# Patient Record
Sex: FEMALE | Race: WHITE | URBAN - NONMETROPOLITAN AREA
[De-identification: names, ages, dates, MRNs, and addresses within clinical notes are randomized per-mention and may not be internally consistent; named-entity substitution may affect disease eponyms.]

---

## 2024-01-08 ENCOUNTER — TELEPHONE (OUTPATIENT)
Dept: FAMILY MEDICINE CLINIC | Age: 59
End: 2024-01-08

## 2024-01-08 NOTE — TELEPHONE ENCOUNTER
Spoke with patient. Her  James had spoke with Dr. Leopolds  and informed her that she is taking new patients. Informed patient I would talk have to talk with Dr. Leopold first and I would get her a call back later today or tomorrow. Please advise.

## 2024-01-08 NOTE — TELEPHONE ENCOUNTER
----- Message from Lawanda De La Torre sent at 1/8/2024 12:39 PM EST -----  Subject: Appointment Request    Reason for Call: New Patient/New to Provider Appointment needed: New   Patient Request Appointment    QUESTIONS    Reason for appointment request? No appointments available during search     Additional Information for Provider? Patient would like if office staff   could call back to establish care with Katelyn A Leopold, MD, patient   request anytime after 11:00am any day but Thursday's   ---------------------------------------------------------------------------  --------------  CALL BACK INFO  [phone]; OK to leave message on voicemail  ---------------------------------------------------------------------------  --------------  SCRIPT ANSWERS

## 2024-01-08 NOTE — TELEPHONE ENCOUNTER
----- Message from Lawanda De La Torre sent at 1/8/2024 12:39 PM EST -----  Subject: Appointment Request    Reason for Call: New Patient/New to Provider Appointment needed: New   Patient Request Appointment    QUESTIONS    Reason for appointment request? No appointments available during search     Additional Information for Provider? Patient would like if office staff   could call back to establish care with Katelyn A Leopold, MD, patient   request anytime after 11:00am any day but Thursday's   ---------------------------------------------------------------------------  --------------  CALL BACK INFO  [phone]; OK to leave message on voicemail  ---------------------------------------------------------------------------  --------------  SCRIPT ANSWERS

## 2024-01-08 NOTE — TELEPHONE ENCOUNTER
I am not familiar with this patient and my  is not familiar either.  I wonder if this patient is referring to the [facility] as she is from [facility]?

## 2024-01-09 ENCOUNTER — OFFICE VISIT (OUTPATIENT)
Dept: FAMILY MEDICINE CLINIC | Age: 59
End: 2024-01-09
Payer: COMMERCIAL

## 2024-01-09 ENCOUNTER — NURSE ONLY (OUTPATIENT)
Dept: LAB | Age: 59
End: 2024-01-09

## 2024-01-09 VITALS
DIASTOLIC BLOOD PRESSURE: 80 MMHG | BODY MASS INDEX: 36.62 KG/M2 | HEIGHT: 65 IN | OXYGEN SATURATION: 97 % | SYSTOLIC BLOOD PRESSURE: 112 MMHG | HEART RATE: 93 BPM | WEIGHT: 219.8 LBS

## 2024-01-09 DIAGNOSIS — R09.81 CHRONIC NASAL CONGESTION: ICD-10-CM

## 2024-01-09 DIAGNOSIS — E66.09 CLASS 2 OBESITY DUE TO EXCESS CALORIES WITHOUT SERIOUS COMORBIDITY WITH BODY MASS INDEX (BMI) OF 36.0 TO 36.9 IN ADULT: ICD-10-CM

## 2024-01-09 DIAGNOSIS — Z00.00 WELLNESS EXAMINATION: Primary | ICD-10-CM

## 2024-01-09 DIAGNOSIS — Z00.00 WELLNESS EXAMINATION: ICD-10-CM

## 2024-01-09 PROCEDURE — 99386 PREV VISIT NEW AGE 40-64: CPT | Performed by: FAMILY MEDICINE

## 2024-01-09 PROCEDURE — G8484 FLU IMMUNIZE NO ADMIN: HCPCS | Performed by: FAMILY MEDICINE

## 2024-01-09 SDOH — ECONOMIC STABILITY: HOUSING INSECURITY
IN THE LAST 12 MONTHS, WAS THERE A TIME WHEN YOU DID NOT HAVE A STEADY PLACE TO SLEEP OR SLEPT IN A SHELTER (INCLUDING NOW)?: NO

## 2024-01-09 SDOH — ECONOMIC STABILITY: FOOD INSECURITY: WITHIN THE PAST 12 MONTHS, YOU WORRIED THAT YOUR FOOD WOULD RUN OUT BEFORE YOU GOT MONEY TO BUY MORE.: NEVER TRUE

## 2024-01-09 SDOH — ECONOMIC STABILITY: FOOD INSECURITY: WITHIN THE PAST 12 MONTHS, THE FOOD YOU BOUGHT JUST DIDN'T LAST AND YOU DIDN'T HAVE MONEY TO GET MORE.: NEVER TRUE

## 2024-01-09 SDOH — ECONOMIC STABILITY: INCOME INSECURITY: HOW HARD IS IT FOR YOU TO PAY FOR THE VERY BASICS LIKE FOOD, HOUSING, MEDICAL CARE, AND HEATING?: NOT HARD AT ALL

## 2024-01-09 ASSESSMENT — PATIENT HEALTH QUESTIONNAIRE - PHQ9
SUM OF ALL RESPONSES TO PHQ QUESTIONS 1-9: 0
SUM OF ALL RESPONSES TO PHQ9 QUESTIONS 1 & 2: 0
SUM OF ALL RESPONSES TO PHQ QUESTIONS 1-9: 0
SUM OF ALL RESPONSES TO PHQ QUESTIONS 1-9: 0
2. FEELING DOWN, DEPRESSED OR HOPELESS: 0
SUM OF ALL RESPONSES TO PHQ QUESTIONS 1-9: 0
1. LITTLE INTEREST OR PLEASURE IN DOING THINGS: 0

## 2024-01-09 NOTE — PATIENT INSTRUCTIONS
Please call with your preferred provider for your colonoscopy and also your preferred ENT provider.  We recommend shingles vaccine and a tetanus booster - you can get these at the pharmacy.

## 2024-01-09 NOTE — PROGRESS NOTES
[facility]  [address] [address] [phone]    Estefany Ohara (:  1965) is a 58 y.o. female, here for evaluation of the following chief complaint(s):  New Patient (Pt here to establish care with Dr. Gin Joe.  Has not had a PCP in close to 20 years.  Pt has had cscope done in [place] about 12 years ago and was clear. Mammogram was last done a year ago and is due and has been clear.  Pt had BW done for recent surgery in [place] - was done in 2023.  Would like to discuss options for weight loss and ongoing sinus issues she has had for many years. )           ASSESSMENT/PLAN:  1. Wellness examination  Pap UTD - sees gyn.  Will establish with new gynecologist or have gyn screenings done here.  Mammogram UTD.    Cscope due - pt will call with preferred provider so we can place referral.  Reviewed recommended vaccines.    Check wellness labs.  Encouraged continued healthy diet and exercise.  - Comprehensive Metabolic Panel; Future  - CBC with Auto Differential; Future  - Lipid Panel; Future  - TSH with Reflex; Future  - Hemoglobin A1C; Future    2. Class 2 obesity due to excess calories without serious comorbidity with body mass index (BMI) of 36.0 to 36.9 in adult  Check labs.  Pt would benefit from medication to assist with weight loss.    3. Chronic nasal congestion  Refer to ENT - pt to call with preferred physician.  Has had allergy testing and has tried antihistamines and NSS previously without adequate relief.    Follow up yearly for wellness visits, in 2-3 months if weight loss medication initiated.       Patient Instructions   Please call with your preferred provider for your colonoscopy and also your preferred ENT provider.  We recommend shingles vaccine and a tetanus booster - you can get these at the pharmacy.    Subjective   SUBJECTIVE/OBJECTIVE:  Needs to establish care with PCP.  Pap - will be seeing

## 2024-01-10 LAB
ALBUMIN SERPL BCG-MCNC: 4.3 G/DL (ref 3.5–5.1)
ALP SERPL-CCNC: 122 U/L (ref 38–126)
ALT SERPL W/O P-5'-P-CCNC: 30 U/L (ref 11–66)
ANION GAP SERPL CALC-SCNC: 11 MEQ/L (ref 8–16)
AST SERPL-CCNC: 22 U/L (ref 5–40)
BASOPHILS ABSOLUTE: 0.1 THOU/MM3 (ref 0–0.1)
BASOPHILS NFR BLD AUTO: 0.7 %
BILIRUB SERPL-MCNC: 0.9 MG/DL (ref 0.3–1.2)
BUN SERPL-MCNC: 14 MG/DL (ref 7–22)
CALCIUM SERPL-MCNC: 9.3 MG/DL (ref 8.5–10.5)
CHLORIDE SERPL-SCNC: 103 MEQ/L (ref 98–111)
CHOLEST SERPL-MCNC: 225 MG/DL (ref 100–199)
CO2 SERPL-SCNC: 23 MEQ/L (ref 23–33)
CREAT SERPL-MCNC: 0.8 MG/DL (ref 0.4–1.2)
DEPRECATED MEAN GLUCOSE BLD GHB EST-ACNC: 108 MG/DL (ref 70–126)
DEPRECATED RDW RBC AUTO: 38.5 FL (ref 35–45)
EOSINOPHIL NFR BLD AUTO: 1.4 %
EOSINOPHILS ABSOLUTE: 0.1 THOU/MM3 (ref 0–0.4)
ERYTHROCYTE [DISTWIDTH] IN BLOOD BY AUTOMATED COUNT: 11.9 % (ref 11.5–14.5)
GFR SERPL CREATININE-BSD FRML MDRD: > 60 ML/MIN/1.73M2
GLUCOSE SERPL-MCNC: 96 MG/DL (ref 70–108)
HBA1C MFR BLD HPLC: 5.6 % (ref 4.4–6.4)
HCT VFR BLD AUTO: 45.1 % (ref 37–47)
HDLC SERPL-MCNC: 68 MG/DL
HGB BLD-MCNC: 15.1 GM/DL (ref 12–16)
IMM GRANULOCYTES # BLD AUTO: 0.04 THOU/MM3 (ref 0–0.07)
IMM GRANULOCYTES NFR BLD AUTO: 0.5 %
LDLC SERPL CALC-MCNC: 140 MG/DL
LYMPHOCYTES ABSOLUTE: 1.3 THOU/MM3 (ref 1–4.8)
LYMPHOCYTES NFR BLD AUTO: 14.4 %
MCH RBC QN AUTO: 29.5 PG (ref 26–33)
MCHC RBC AUTO-ENTMCNC: 33.5 GM/DL (ref 32.2–35.5)
MCV RBC AUTO: 88.3 FL (ref 81–99)
MONOCYTES ABSOLUTE: 0.7 THOU/MM3 (ref 0.4–1.3)
MONOCYTES NFR BLD AUTO: 7.7 %
NEUTROPHILS NFR BLD AUTO: 75.3 %
NRBC BLD AUTO-RTO: 0 /100 WBC
PLATELET # BLD AUTO: 202 THOU/MM3 (ref 130–400)
PMV BLD AUTO: 13 FL (ref 9.4–12.4)
POTASSIUM SERPL-SCNC: 4.2 MEQ/L (ref 3.5–5.2)
PROT SERPL-MCNC: 7.3 G/DL (ref 6.1–8)
RBC # BLD AUTO: 5.11 MILL/MM3 (ref 4.2–5.4)
SEGMENTED NEUTROPHILS ABSOLUTE COUNT: 6.6 THOU/MM3 (ref 1.8–7.7)
SODIUM SERPL-SCNC: 137 MEQ/L (ref 135–145)
TRIGL SERPL-MCNC: 86 MG/DL (ref 0–199)
TSH SERPL DL<=0.005 MIU/L-ACNC: 0.73 UIU/ML (ref 0.4–4.2)
WBC # BLD AUTO: 8.7 THOU/MM3 (ref 4.8–10.8)

## 2024-01-11 ENCOUNTER — TELEPHONE (OUTPATIENT)
Dept: FAMILY MEDICINE CLINIC | Age: 59
End: 2024-01-11

## 2024-01-11 DIAGNOSIS — E66.09 CLASS 2 OBESITY DUE TO EXCESS CALORIES WITHOUT SERIOUS COMORBIDITY WITH BODY MASS INDEX (BMI) OF 36.0 TO 36.9 IN ADULT: Primary | ICD-10-CM

## 2024-01-11 RX ORDER — SEMAGLUTIDE 0.25 MG/.5ML
0.25 INJECTION, SOLUTION SUBCUTANEOUS
Qty: 2 ML | Refills: 0 | Status: SHIPPED | OUTPATIENT
Start: 2024-01-11

## 2024-01-11 NOTE — TELEPHONE ENCOUNTER
----- Message from Gin Joe MD sent at 1/11/2024  8:21 AM EST -----  Please let pt know that her cholesterol is a bit high.  The best thing for her to do diet wise is increase the fiber in her diet.  Her cardiac risk is low overall, so we do not need medication for cholesterol at this time.  The other lab results are good.    The 10-year ASCVD risk score (Ly KO, et al., 2019) is: 1.6%    Values used to calculate the score:      Age: 58 years      Sex: Female      Is Non- : No      Diabetic: No      Tobacco smoker: No      Systolic Blood Pressure: 112 mmHg      Is BP treated: No      HDL Cholesterol: 68 mg/dL      Total Cholesterol: 225 mg/dL

## 2024-01-11 NOTE — TELEPHONE ENCOUNTER
Pls let pt know that I sent a prescription for Wegovy for weight loss.  We will likely have to get PA from insurance.

## 2024-01-12 RX ORDER — TIRZEPATIDE 2.5 MG/.5ML
2.5 INJECTION, SOLUTION SUBCUTANEOUS WEEKLY
Qty: 2 ML | Refills: 0 | Status: SHIPPED | OUTPATIENT
Start: 2024-01-12

## 2024-01-12 NOTE — TELEPHONE ENCOUNTER
I sent rx for Zepbound.  It is similar to Wegovy.  It will likely need prior authorization by her insurance.

## 2024-01-12 NOTE — TELEPHONE ENCOUNTER
I called Estefany and let her know Dr. Joe's response to the lab results that came in. Esetfany stated understanding.

## 2024-01-12 NOTE — TELEPHONE ENCOUNTER
I called Estefany and let her know that Dr. Joe did send in a RX for the Wegovy for weight loss but more than likely it will need a PA done for insurance to approve! Estefany stated understanding!

## 2024-01-12 NOTE — TELEPHONE ENCOUNTER
Estefany states her pharmacy doesn't have Wegovy. She would like to know if Zepebound is the same etc. Can we call this in instead. States she has a sensitive stomach. [facility]

## 2024-02-08 ENCOUNTER — PATIENT MESSAGE (OUTPATIENT)
Dept: FAMILY MEDICINE CLINIC | Age: 59
End: 2024-02-08

## 2024-02-08 DIAGNOSIS — E66.09 CLASS 2 OBESITY DUE TO EXCESS CALORIES WITHOUT SERIOUS COMORBIDITY WITH BODY MASS INDEX (BMI) OF 36.0 TO 36.9 IN ADULT: ICD-10-CM

## 2024-02-08 RX ORDER — SEMAGLUTIDE 0.25 MG/.5ML
0.25 INJECTION, SOLUTION SUBCUTANEOUS
Qty: 2 ML | Refills: 0 | Status: SHIPPED | OUTPATIENT
Start: 2024-02-08

## 2024-02-08 NOTE — TELEPHONE ENCOUNTER
Wegovy denied via Epic:    [facility] Reference Number: [id number]. WEGOVY INJ 0.25MG is denied for not meeting the prior authorization requirement(s). Details of this decision are in the notice attached below or have been faxed to you. Appeals are not supported through ePA. Please refer to the fax case notice for appeals information and instructions. Case ID: [id number]      Payer: [employer] Patient's Payer    [phone]   Electronic appeal: Not supported

## 2024-02-08 NOTE — TELEPHONE ENCOUNTER
Called and spoke with Estefany about the issues around prior authorization of weight loss medication prescribed 1/11/24.  Explained the process on our side.  Apologized for delays.  Plan is to check on status of authorization Monday, 2/12, and update patient sometime that day.  She is in agreement with that plan.